# Patient Record
Sex: FEMALE | URBAN - METROPOLITAN AREA
[De-identification: names, ages, dates, MRNs, and addresses within clinical notes are randomized per-mention and may not be internally consistent; named-entity substitution may affect disease eponyms.]

---

## 2023-05-18 ENCOUNTER — OFFICE VISIT (OUTPATIENT)
Dept: URBAN - METROPOLITAN AREA CLINIC 29 | Facility: CLINIC | Age: 28
End: 2023-05-18
Payer: COMMERCIAL

## 2023-05-18 ENCOUNTER — LAB OUTSIDE AN ENCOUNTER (OUTPATIENT)
Dept: URBAN - METROPOLITAN AREA CLINIC 29 | Facility: CLINIC | Age: 28
End: 2023-05-18

## 2023-05-18 ENCOUNTER — DASHBOARD ENCOUNTERS (OUTPATIENT)
Age: 28
End: 2023-05-18

## 2023-05-18 VITALS
SYSTOLIC BLOOD PRESSURE: 112 MMHG | DIASTOLIC BLOOD PRESSURE: 81 MMHG | HEIGHT: 67 IN | HEART RATE: 67 BPM | BODY MASS INDEX: 22.29 KG/M2 | WEIGHT: 142 LBS

## 2023-05-18 DIAGNOSIS — R10.12 LEFT UPPER QUADRANT PAIN: ICD-10-CM

## 2023-05-18 PROBLEM — 301715003: Status: ACTIVE | Noted: 2023-05-18

## 2023-05-18 PROCEDURE — 99203 OFFICE O/P NEW LOW 30 MIN: CPT | Performed by: INTERNAL MEDICINE

## 2023-05-18 RX ORDER — SUCRALFATE 1 G/1
1 TABLET ON AN EMPTY STOMACH TABLET ORAL TWICE A DAY
Qty: 60 | OUTPATIENT
Start: 2023-05-18 | End: 2023-06-17

## 2023-05-18 NOTE — HPI-TODAY'S VISIT:
Ms. Galloway is a 27-year-old  female who presents today per referral from Dr. العلي for evaluation of abdominal pain.  She has been experiencing left upper quadrant abdominal discomfort for the past 3 months.  She has a history of GERD and tried taking over-the-counter Prilosec 20 mg for the past 8 weeks with no improvement of her symptoms.  Her symptoms are often worse after eating spicy foods, drinking alcohol and even with empty stomach.  She also reports associated nausea without vomiting.  She denies any use of aspirin or NSAIDs.  She admits to having regular bowel movements.  Otherwise, she has no other GI complaints.  Remainder of her medical history is significant for anxiety.

## 2023-05-22 LAB
H PYLORI BREATH TEST: NOT DETECTED
H. PYLORI BREATH TEST: NOT DETECTED
INTERPRETATION: NOT DETECTED

## 2023-05-30 ENCOUNTER — OFFICE VISIT (OUTPATIENT)
Dept: URBAN - METROPOLITAN AREA MEDICAL CENTER 8 | Facility: MEDICAL CENTER | Age: 28
End: 2023-05-30
Payer: COMMERCIAL

## 2023-05-30 DIAGNOSIS — R10.12 ABDOMINAL BURNING SENSATION IN LEFT UPPER QUADRANT: ICD-10-CM

## 2023-05-30 PROCEDURE — 43235 EGD DIAGNOSTIC BRUSH WASH: CPT | Performed by: INTERNAL MEDICINE

## 2023-05-30 RX ORDER — SUCRALFATE 1 G/1
1 TABLET ON AN EMPTY STOMACH TABLET ORAL TWICE A DAY
Qty: 60 | Status: ACTIVE | COMMUNITY
Start: 2023-05-18 | End: 2023-06-17

## 2023-06-22 ENCOUNTER — OFFICE VISIT (OUTPATIENT)
Dept: URBAN - METROPOLITAN AREA CLINIC 92 | Facility: CLINIC | Age: 28
End: 2023-06-22

## 2023-06-22 NOTE — HPI-TODAY'S VISIT:
Ms. Galloway is a 27-year-old  female who presents today per referral from Dr. العلي for evaluation of abdominal pain.  She has been experiencing left upper quadrant abdominal discomfort for the past 3 months.  She has a history of GERD and tried taking over-the-counter Prilosec 20 mg for the past 8 weeks with no improvement of her symptoms.  Her symptoms are often worse after eating spicy foods, drinking alcohol and even with empty stomach.  She also reports associated nausea without vomiting.  She denies any use of aspirin or NSAIDs.  She admits to having regular bowel movements.  Otherwise, she has no other GI complaints.  Remainder of her medical history is significant for anxiety. Upper endoscopy 3- demonstrated no abnormalities no specimens collected.  H. pylori breath test was negative